# Patient Record
Sex: FEMALE | Race: WHITE | Employment: FULL TIME | URBAN - METROPOLITAN AREA
[De-identification: names, ages, dates, MRNs, and addresses within clinical notes are randomized per-mention and may not be internally consistent; named-entity substitution may affect disease eponyms.]

---

## 2018-04-20 ENCOUNTER — THERAPY VISIT (OUTPATIENT)
Dept: PHYSICAL THERAPY | Facility: CLINIC | Age: 48
End: 2018-04-20
Payer: OTHER MISCELLANEOUS

## 2018-04-20 DIAGNOSIS — M25.562 LEFT KNEE PAIN: Primary | ICD-10-CM

## 2018-04-20 DIAGNOSIS — R26.9 GAIT ABNORMALITY: ICD-10-CM

## 2018-04-20 PROCEDURE — 97162 PT EVAL MOD COMPLEX 30 MIN: CPT | Mod: GP | Performed by: PHYSICAL THERAPIST

## 2018-04-20 PROCEDURE — 97110 THERAPEUTIC EXERCISES: CPT | Mod: GP | Performed by: PHYSICAL THERAPIST

## 2018-04-20 ASSESSMENT — ACTIVITIES OF DAILY LIVING (ADL)
PAIN: THE SYMPTOM PREVENTS ME FROM ALL DAILY ACTIVITIES
KNEEL ON THE FRONT OF YOUR KNEE: I AM UNABLE TO DO THE ACTIVITY
STAND: ACTIVITY IS VERY DIFFICULT
SIT WITH YOUR KNEE BENT: ACTIVITY IS VERY DIFFICULT
AS_A_RESULT_OF_YOUR_KNEE_INJURY,_HOW_WOULD_YOU_RATE_YOUR_CURRENT_LEVEL_OF_DAILY_ACTIVITY?: SEVERELY ABNORMAL
WALK: ACTIVITY IS VERY DIFFICULT
GIVING WAY, BUCKLING OR SHIFTING OF KNEE: THE SYMPTOM PREVENTS ME FROM ALL DAILY ACTIVITIES
GO DOWN STAIRS: ACTIVITY IS VERY DIFFICULT
SQUAT: I AM UNABLE TO DO THE ACTIVITY
WEAKNESS: THE SYMPTOM PREVENTS ME FROM ALL DAILY ACTIVITIES
STIFFNESS: THE SYMPTOM PREVENTS ME FROM ALL DAILY ACTIVITIES
HOW_WOULD_YOU_RATE_THE_OVERALL_FUNCTION_OF_YOUR_KNEE_DURING_YOUR_USUAL_DAILY_ACTIVITIES?: SEVERELY ABNORMAL
HOW_WOULD_YOU_RATE_THE_CURRENT_FUNCTION_OF_YOUR_KNEE_DURING_YOUR_USUAL_DAILY_ACTIVITIES_ON_A_SCALE_FROM_0_TO_100_WITH_100_BEING_YOUR_LEVEL_OF_KNEE_FUNCTION_PRIOR_TO_YOUR_INJURY_AND_0_BEING_THE_INABILITY_TO_PERFORM_ANY_OF_YOUR_USUAL_DAILY_ACTIVITIES?: 10
GO UP STAIRS: ACTIVITY IS VERY DIFFICULT
RISE FROM A CHAIR: ACTIVITY IS VERY DIFFICULT
SWELLING: THE SYMPTOM PREVENTS ME FROM ALL DAILY ACTIVITIES

## 2018-04-20 NOTE — MR AVS SNAPSHOT
After Visit Summary   4/20/2018    Charlotte Driscoll    MRN: [id number]           Patient Information     Date Of Birth          1970        Visit Information        Provider Department      4/20/2018 8:50 AM Clementina Pardo PT [place] [facility] Physical Therapy        Today's Diagnoses     Left knee pain    -  1    Gait abnormality           Follow-ups after your visit        Your next 10 appointments already scheduled     Apr 23, 2018  1:50 PM CDT   CHARANJIT Extremity with Eri Baron ATC   [place] [facility] ([facility]  [facility]    [address] [place] [address]   [phone]            Apr 25, 2018  2:00 PM CDT   CHARANJIT Extremity with Eri Baron ATC   [facility] Physical Therapy ([facility]  )    [address] [place] [address]   [phone]            Apr 30, 2018  1:50 PM CDT   CHARANJIT Extremity with Eri Baron ATC   [place] [facility] ([facility]  [facility]    [address] [place] [address]   [phone]            May 02, 2018  2:00 PM CDT   CHARANJIT Extremity with Eri Baron ATC   [facility] ([facility]  )    [address] [place] [address]   [phone]            May 07, 2018 12:50 PM CDT   CHARANJIT Extremity with Clementina Pardo PT   [place] [facility] ([facility]  )    [address] [place] [address]   [phone]            May 09, 2018  2:00 PM CDT   CHARANJIT Extremity with Eri Baron ATC   [facility] ([facility]  )    [address] [place] [address]   [phone]            May 14, 2018 11:50 AM CDT   CHARANJIT Extremity with Eri Baron ATC   [facility]  "Physical Therapy ([facility]  )    [address] [address]   [phone]            May 16, 2018  2:00 PM RADHAT   CHARANJIT [place] with Eri Baron ATC   [facility] ([facility]  )    [address] [address]   [phone]              Who to contact     If you have questions or need follow up information about today's clinic visit or your schedule please contact [facility] directly at [phone].  Normal or non-critical lab and imaging results will be communicated to you by MyChart, letter or phone within 4 business days after the clinic has received the results. If you do not hear from us within 7 days, please contact the clinic through [employer] or phone. If you have a critical or abnormal lab result, we will notify you by phone as soon as possible.  Submit refill requests through [employer] or call your pharmacy and they will forward the refill request to us. Please allow 3 business days for your refill to be completed.          Additional Information About Your Visit        [employer] Information     [employer] lets you send messages to your doctor, view your test results, renew your prescriptions, schedule appointments and more. To sign up, go to [email or web address] . Click on \"Log in\" on the left side of the screen, which will take you to the Welcome page. Then click on \"Sign up Now\" on the right side of the page.     You will be asked to enter the access code listed below, as well as some personal information. Please follow the directions to create your username and password.     Your access code is: [email or web address]  Expires: 2018 10:17 AM     Your access code will  in 90 days. If you need help or a new code, please call your [place] clinic or [phone].        Care EveryWhere ID     This is your Care EveryWhere ID. This could be used by other " organizations to access your [place] medical records  [id number]         Blood Pressure from Last 3 Encounters:   12/01/16 132/89   05/13/14 120/72    Weight from Last 3 Encounters:   12/01/16 83.9 kg (185 lb)              We Performed the Following     CHARANJIT Inital Eval Report     PT Eval, Moderate Complexity (06623)     Therapeutic Exercises        Primary Care Provider Office Phone # Fax #    Hope Griggs [facility] [phone]       [address] [place] [address]        Equal Access to Services     [facility] Hospital[facility]UNDE : Hadii aad ku hadasho Soomaali, waaxda luqadaha, qaybta kaalmada adeegyada, waxay idiin hayaan endy pickett . So [facility] [phone].    ATENCIÓN: Si habla español, tiene a aguilar disposición servicios gratuitos de asistencia lingüística. [facility] [phone].    We comply with applicable federal civil rights laws and [place] laws. We do not discriminate on the basis of race, color, national origin, age, disability, sex, sexual orientation, or gender identity.            Thank you!     Thank you for choosing [facility]  for your care. Our goal is always to provide you with excellent care. Hearing back from our patients is one way we can continue to improve our services. Please take a few minutes to complete the written survey that you may receive in the mail after your visit with us. Thank you!             Your Updated Medication List - Protect others around you: Learn how to safely use, store and throw away your medicines at www.disposemymeds.org.          This list is accurate as of 4/20/18 10:17 AM.  Always use your most recent med list.                   Brand Name Dispense Instructions for use Diagnosis    ibuprofen 800 MG tablet    ADVIL/MOTRIN    100 tablet    Take 1 tablet (800 mg) by mouth every 8 hours as needed for moderate pain    Dog bite of right lower leg, initial encounter       LYRICA PO      Take 100 mg by  mouth 3 times daily        oxyCODONE-acetaminophen 5-325 MG per tablet    PERCOCET     Take 1 tablet by mouth 2 times daily

## 2018-04-20 NOTE — LETTER
[facility]  [address] [address]  [phone]    2018    Re: Charlotte Driscoll   :   1970  MRN:  [id number]   REFERRING PHYSICIAN:   Darius Hooks    [facility] PHYSICAL [facility]    Date of Initial Evaluation:  2018  Visits:  Rxs Used: 1  Reason for Referral:     Left knee pain  Gait abnormality    EVALUATION SUMMARY    [facility]  Subjective:  Patient is a 47 year old female presenting with rehab left knee hpi. The history is provided by the patient.   Charlotte Driscoll is a 47 year old female with a left knee condition.  Condition occurred with:  A fall/slip.  Condition occurred: at work.  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 Smoker    PMH: L ankle instability, planning to have surgery for that as soon as her knee recovers.    Patient reports pain:  Anterior.  Radiates to:  Thigh and knee.  Pain is described as sharp  and reported as 4/10.  Associated symptoms:  Loss of motion/stiffness and edema. Pain is worse in the P.M..  Symptoms are exacerbated by weight bearing, activity, walking, ascending stairs and descending stairs and relieved by ice.  Since onset symptoms are gradually improving.    Previous treatment includes surgery.    General health as reported by patient is fair.                          Objective:    Gait:  Using a crutch prn  Gait Type:  Antalgic   Weight Bearing Status:  WBAT     Deviations:  Knee:  Knee extension decr LAnkle:  Push off decr L       Knee Evaluation:  ROM:  Strength wnl knee: L knee fair QS, SLR with assist.  AROM      Re: Charlotte Driscoll   :   1970    Extension: Left: 0    Right:   Flexion: Left: 105   Right:    Palpation:  Palpation of knee: blistering rash on distal quad across  top of distal thigh. Pt reports MD diagnosed as allergic reaction. Inciisions dry and healing.  Left knee tenderness present at:  Medial Joint Line and Incisional    Edema:  Edema of the knee: + brush test, general effusion throughout L knee.    Assessment/Plan:    Patient is a 47 year old female with left side knee complaints.    Patient has the following significant findings with corresponding treatment plan.                Diagnosis 1:  S/p L knee partial menisectomy  Pain -  hot/cold therapy, manual therapy, self management, education and home program  Decreased ROM/flexibility - manual therapy, therapeutic exercise and home program  Decreased joint mobility - manual therapy, therapeutic exercise and home program  Decreased strength - therapeutic exercise, therapeutic activities and home program  Decreased proprioception - neuro re-education, gait training, therapeutic activities and home program  Edema - electric stimulation, cold therapy and self management/home program  Impaired gait - gait training and home program    Therapy Evaluation Codes:   1) History comprised of:   Personal factors that impact the plan of care:      Age, Gender, Living environment, Past/current experiences, Profession, Time   since onset of symptoms and Work status.    Comorbidity factors that impact the plan of care are:      Smoking and Weakness.     Medications impacting care: Anti-inflammatory and Pain.  2) Examination of Body Systems comprised of:   Body structures and functions that impact the plan of care:      Ankle, Knee and Pelvis.   Activity limitations that impact the plan of care are:      Squatting/kneeling, Stairs, Standing and Walking.  3) Clinical presentation characteristics are:   Evolving/Changing.  4) Decision-Making    Moderate complexity using standardized patient assessment instrument and/or   measureable assessment of functional outcome.  Cumulative Therapy Evaluation is: Moderate complexity.    Previous and  current functional limitations:  (See Goal Flow Sheet for this information)    Short term and Long term goals: (See Goal Flow Sheet for this information)     Communication ability:  Patient appears to be able to clearly communicate and understand verbal and written communication and follow directions correctly.  Treatment Explanation - The following has been discussed with the patient:   RX ordered/plan of care    Re: Charlotte Driscoll   :   1970    Anticipated outcomes  Possible risks and side effects  This patient would benefit from PT intervention to resume normal activities.   Rehab potential is good.    Frequency:  2 X week, once daily  Duration:  for 4 weeks tapering to 1 X a week over 4 weeks  Discharge Plan:  Achieve all LTG.  Independent in home treatment program.  Return to work with or without restrictions.  Reach maximal therapeutic benefit.    Please refer to the daily flowsheet for treatment today, total treatment time and time spent performing 1:1 timed codes.     Thank you for your referral.    INQUIRIES  Therapist: Clementina Pardo DPT  [facility] PHYSICAL THERAP[facility]  [address] [place] [address]  Phone: [phone]  Fax: [phone]

## 2018-04-20 NOTE — PROGRESS NOTES
[place] [facility] Initial Evaluation  Subjective:  Patient is a 47 year old female presenting with rehab left knee hpi. The history is provided by the patient.   Charlotte Driscoll is a 47 year old female with a left knee condition.  Condition occurred with:  A fall/slip.  Condition occurred: at work.  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    Patient reports pain:  Anterior.  Radiates to:  Thigh and knee.  Pain is described as sharp  and reported as 4/10.  Associated symptoms:  Loss of motion/stiffness and edema. Pain is worse in the P.M..  Symptoms are exacerbated by weight bearing, activity, walking, ascending stairs and descending stairs and relieved by ice.  Since onset symptoms are gradually improving.    Previous treatment includes surgery.    General health as reported by patient is fair.                                              Objective:    Gait:  Using a crutch prn  Gait Type:  Antalgic   Weight Bearing Status:  WBAT     Deviations:  Knee:  Knee extension decr LAnkle:  Push off decr L                                                      Knee Evaluation:  ROM:  Strength wnl knee: L knee fair QS, SLR with assist.  AROM      Extension: Left: 0    Right:   Flexion: Left: 105   Right:              Palpation:  Palpation of knee: blistering rash on distal quad across top of distal thigh. Pt reports MD diagnosed as allergic reaction. Inciisions dry and healing.  Left knee tenderness present at:  Medial Joint Line and Incisional    Edema:  Edema of the knee: + brush test, general effusion throughout L knee.            General     ROS    Assessment/Plan:    Patient is a 47 year old female with left side knee complaints.    Patient has the following significant findings with corresponding  treatment plan.                Diagnosis 1:  S/p L knee partial menisectomy  Pain -  hot/cold therapy, manual therapy, self management, education and home program  Decreased ROM/flexibility - manual therapy, therapeutic exercise and home program  Decreased joint mobility - manual therapy, therapeutic exercise and home program  Decreased strength - therapeutic exercise, therapeutic activities and home program  Decreased proprioception - neuro re-education, gait training, therapeutic activities and home program  Edema - electric stimulation, cold therapy and self management/home program  Impaired gait - gait training and home program    Therapy Evaluation Codes:   1) History comprised of:   Personal factors that impact the plan of care:      Age, Gender, Living environment, Past/current experiences, Profession, Time since onset of symptoms and Work status.    Comorbidity factors that impact the plan of care are:      Smoking and Weakness.     Medications impacting care: Anti-inflammatory and Pain.  2) Examination of Body Systems comprised of:   Body structures and functions that impact the plan of care:      Ankle, Knee and Pelvis.   Activity limitations that impact the plan of care are:      Squatting/kneeling, Stairs, Standing and Walking.  3) Clinical presentation characteristics are:   Evolving/Changing.  4) Decision-Making    Moderate complexity using standardized patient assessment instrument and/or measureable assessment of functional outcome.  Cumulative Therapy Evaluation is: Moderate complexity.    Previous and current functional limitations:  (See Goal Flow Sheet for this information)    Short term and Long term goals: (See Goal Flow Sheet for this information)     Communication ability:  Patient appears to be able to clearly communicate and understand verbal and written communication and follow directions correctly.  Treatment Explanation - The following has been discussed with the patient:   RX  ordered/plan of care  Anticipated outcomes  Possible risks and side effects  This patient would benefit from PT intervention to resume normal activities.   Rehab potential is good.    Frequency:  2 X week, once daily  Duration:  for 4 weeks tapering to 1 X a week over 4 weeks  Discharge Plan:  Achieve all LTG.  Independent in home treatment program.  Return to work with or without restrictions.  Reach maximal therapeutic benefit.    Please refer to the daily flowsheet for treatment today, total treatment time and time spent performing 1:1 timed codes.

## 2018-04-23 ENCOUNTER — THERAPY VISIT (OUTPATIENT)
Dept: PHYSICAL THERAPY | Facility: CLINIC | Age: 48
End: 2018-04-23
Payer: OTHER MISCELLANEOUS

## 2018-04-23 DIAGNOSIS — R26.9 GAIT ABNORMALITY: ICD-10-CM

## 2018-04-23 DIAGNOSIS — M25.562 LEFT KNEE PAIN: ICD-10-CM

## 2018-04-23 PROCEDURE — 97112 NEUROMUSCULAR REEDUCATION: CPT | Mod: GP

## 2018-04-23 PROCEDURE — 97110 THERAPEUTIC EXERCISES: CPT | Mod: GP

## 2018-04-25 ENCOUNTER — THERAPY VISIT (OUTPATIENT)
Dept: PHYSICAL THERAPY | Facility: CLINIC | Age: 48
End: 2018-04-25
Payer: OTHER MISCELLANEOUS

## 2018-04-25 DIAGNOSIS — R26.9 GAIT ABNORMALITY: ICD-10-CM

## 2018-04-25 DIAGNOSIS — M25.562 LEFT KNEE PAIN: ICD-10-CM

## 2018-04-25 PROCEDURE — 97110 THERAPEUTIC EXERCISES: CPT | Mod: GP

## 2018-04-25 PROCEDURE — 97112 NEUROMUSCULAR REEDUCATION: CPT | Mod: GP

## 2018-05-07 ENCOUNTER — THERAPY VISIT (OUTPATIENT)
Dept: PHYSICAL THERAPY | Facility: CLINIC | Age: 48
End: 2018-05-07
Payer: OTHER MISCELLANEOUS

## 2018-05-07 DIAGNOSIS — M25.562 LEFT KNEE PAIN: ICD-10-CM

## 2018-05-07 DIAGNOSIS — R26.9 GAIT ABNORMALITY: ICD-10-CM

## 2018-05-07 PROCEDURE — 97112 NEUROMUSCULAR REEDUCATION: CPT | Mod: GP | Performed by: PHYSICAL THERAPIST

## 2018-05-07 PROCEDURE — 97110 THERAPEUTIC EXERCISES: CPT | Mod: GP | Performed by: PHYSICAL THERAPIST

## 2018-05-09 ENCOUNTER — THERAPY VISIT (OUTPATIENT)
Dept: PHYSICAL THERAPY | Facility: CLINIC | Age: 48
End: 2018-05-09
Payer: OTHER MISCELLANEOUS

## 2018-05-09 DIAGNOSIS — R26.9 GAIT ABNORMALITY: ICD-10-CM

## 2018-05-09 DIAGNOSIS — M25.562 LEFT KNEE PAIN: ICD-10-CM

## 2018-05-09 PROCEDURE — 97110 THERAPEUTIC EXERCISES: CPT | Mod: GP

## 2018-05-09 PROCEDURE — 97112 NEUROMUSCULAR REEDUCATION: CPT | Mod: GP

## 2018-05-16 ENCOUNTER — THERAPY VISIT (OUTPATIENT)
Dept: PHYSICAL THERAPY | Facility: CLINIC | Age: 48
End: 2018-05-16
Payer: OTHER MISCELLANEOUS

## 2018-05-16 DIAGNOSIS — R26.9 GAIT ABNORMALITY: ICD-10-CM

## 2018-05-16 DIAGNOSIS — M25.562 LEFT KNEE PAIN: ICD-10-CM

## 2018-05-16 PROCEDURE — 97112 NEUROMUSCULAR REEDUCATION: CPT | Mod: GP

## 2018-05-16 PROCEDURE — 97110 THERAPEUTIC EXERCISES: CPT | Mod: GP

## 2018-05-16 NOTE — MR AVS SNAPSHOT
After Visit Summary   5/16/2018    Charlotte Driscoll    MRN: [id number]           Patient Information     Date Of Birth          1970        Visit Information        Provider Department      5/16/2018 2:00 PM Eri Baron ATC [facility]        Today's Diagnoses     Left knee pain        Gait abnormality           Follow-ups after your visit        Your next 10 appointments already scheduled     May 23, 2018 12:40 PM CDT   CHARANJIT Extremity with Eri Baron ATC   [facility] Physical Therapy ([facility]  )    [address] [place] [address]   [phone]            May 30, 2018 12:40 PM CDT   CHARANJIT Extremity with Eri Baron ATC   [facility] Physical Therapy ([facility]  )    [address] [place] [address]   [phone]            Jun 06, 2018 10:10 AM CDT   CHARANJIT Extremity with Clementina Pardo PT   [facility] Physical Therapy ([facility]  )    [address] [place] [address]   [phone]              Who to contact     If you have questions or need follow up information about today's clinic visit or your schedule please contact [facility] PHYSICAL THERAPY directly at [phone].  Normal or non-critical lab and imaging results will be communicated to you by MyChart, letter or phone within 4 business days after the clinic has received the results. If you do not hear from us within 7 days, please contact the clinic through MyChart or phone. If you have a critical or abnormal lab result, we will notify you by phone as soon as possible.  Submit refill requests through [employer] or call your pharmacy and they will forward the refill request to us. Please allow 3 business days for your refill to be completed.          Additional Information About Your  "Visit        [employer] Information     [employer] lets you send messages to your doctor, view your test results, renew your prescriptions, schedule appointments and more. To sign up, go to [email or web address] . Click on \"Log in\" on the left side of the screen, which will take you to the Welcome page. Then click on \"Sign up Now\" on the right side of the page.     You will be asked to enter the access code listed below, as well as some personal information. Please follow the directions to create your username and password.     Your access code is: [email or web address]  Expires: 2018 10:17 AM     Your access code will  in 90 days. If you need help or a new code, please call your [place] clinic or [phone].        Care EveryWhere ID     This is your Care EveryWhere ID. This could be used by other organizations to access your [place] medical records  [id number]         Blood Pressure from Last 3 Encounters:   16 132/89   14 120/72    Weight from Last 3 Encounters:   16 83.9 kg (185 lb)              We Performed the Following     Neuromuscular Re-Education     Therapeutic Exercises        Primary Care Provider Office Phone # Fax #    [facility] [phone]       [address] [place], [place] [id number]        Equal Access to Services     CLARA COOPER : Hadii mynor garcia hadkhoio Socaryn, waaxda luqadaha, qaybta kaalmada ketty, rula pickett . So [facility] [phone].    ATENCIÓN: Si habla español, tiene a aguilar disposición servicios gratuitos de asistencia lingüística. Shant al [phone].    We comply with applicable federal civil rights laws and [place] laws. We do not discriminate on the basis of race, color, national origin, age, disability, sex, sexual orientation, or gender identity.            Thank you!     Thank you for choosing [facility] PHYSICAL THERAPY  for your care. Our goal is always to provide " you with excellent care. Hearing back from our patients is one way we can continue to improve our services. Please take a few minutes to complete the written survey that you may receive in the mail after your visit with us. Thank you!             Your Updated Medication List - Protect others around you: Learn how to safely use, store and throw away your medicines at www.disposemymeds.org.          This list is accurate as of 5/16/18  2:45 PM.  Always use your most recent med list.                   Brand Name Dispense Instructions for use Diagnosis    ibuprofen 800 MG tablet    ADVIL/MOTRIN    100 tablet    Take 1 tablet (800 mg) by mouth every 8 hours as needed for moderate pain    Dog bite of right lower leg, initial encounter       LYRICA PO      Take 100 mg by mouth 3 times daily        oxyCODONE-acetaminophen 5-325 MG per tablet    PERCOCET     Take 1 tablet by mouth 2 times daily

## 2018-05-16 NOTE — PROGRESS NOTES
Subjective:  HPI                    Objective:  System    Physical Exam    General     ROS    Assessment/Plan:    PROGRESS  REPORT    Progress reporting period is from 4/20/2018 to 5/16/2018.     SUBJECTIVE  Subjective: feeling good today. pushing herself a little more.  Can walk a mile, mild soreness post walk but no pain. Pt reports feeling less stiff overall.                 ;   ,     The subjective and objective information are from the last SOAP note on this patient.    OBJECTIVE  Objective: see MD Friday. still struggles with eccentric strength .balance improving. Good quad setting with SLR- no lag present active knee flex in supine- knee to chest positon (135      ASSESSMENT/PLAN  Updated problem list and treatment plan: Diagnosis 1:  S/p Left medial  Knee menisectomy  Pain -  hot/cold therapy, self management and home program  Decreased ROM/flexibility - therapeutic exercise, therapeutic activity and home program  Decreased strength - therapeutic exercise, therapeutic activities and home program  Decreased proprioception - neuro re-education, therapeutic activities and home program  STG/LTGs have been met or progress has been made towards goals:  Yes (See Goal flow sheet completed today.)  Assessment of Progress: The patient's condition is improving.  Patient is meeting short term goals and is progressing towards long term goals.  Self Management Plans:  Patient has been instructed in a home treatment program.  Patient is independent in a home treatment program.  I have re-evaluated this patient and find that the nature, scope, duration and intensity of the therapy is appropriate for the medical condition of the patient.  Charlotte continues to require the following intervention to meet STG and LTG's: PT  The patient is returning to your office for a recheck appointment.    Recommendations:  This patient would benefit from continued therapy.     Frequency:  1 X week, once daily  Duration:  For 3 additional  visit.  That would be a total of 9 visits authorized by insurance      This patient would benefit from further evaluation.    Please refer to the daily flowsheet for treatment today, total treatment time and time spent performing 1:1 timed codes.

## 2018-05-23 ENCOUNTER — THERAPY VISIT (OUTPATIENT)
Dept: PHYSICAL THERAPY | Facility: CLINIC | Age: 48
End: 2018-05-23
Payer: OTHER MISCELLANEOUS

## 2018-05-23 DIAGNOSIS — R26.9 GAIT ABNORMALITY: ICD-10-CM

## 2018-05-23 DIAGNOSIS — M25.562 LEFT KNEE PAIN: ICD-10-CM

## 2018-05-23 PROCEDURE — 97110 THERAPEUTIC EXERCISES: CPT | Mod: GP | Performed by: PHYSICAL THERAPIST

## 2018-05-23 PROCEDURE — 97530 THERAPEUTIC ACTIVITIES: CPT | Mod: GP | Performed by: PHYSICAL THERAPIST

## 2018-05-23 PROCEDURE — 97112 NEUROMUSCULAR REEDUCATION: CPT | Mod: GP | Performed by: PHYSICAL THERAPIST

## 2018-06-06 ENCOUNTER — THERAPY VISIT (OUTPATIENT)
Dept: PHYSICAL THERAPY | Facility: CLINIC | Age: 48
End: 2018-06-06
Payer: OTHER MISCELLANEOUS

## 2018-06-06 DIAGNOSIS — M25.562 LEFT KNEE PAIN: ICD-10-CM

## 2018-06-06 DIAGNOSIS — R26.9 GAIT ABNORMALITY: ICD-10-CM

## 2018-06-06 PROCEDURE — 97110 THERAPEUTIC EXERCISES: CPT | Mod: GP | Performed by: PHYSICAL THERAPIST

## 2018-06-06 PROCEDURE — 97112 NEUROMUSCULAR REEDUCATION: CPT | Mod: GP | Performed by: PHYSICAL THERAPIST

## 2018-06-06 NOTE — LETTER
[facility]  [address] [address]  [phone]    2018    Re: Charlotte Driscoll   :   1970  MRN:  [id number]   REFERRING PHYSICIAN:   Darius Hooks    [facility]    Date of Initial Evaluation:  18  Visits:  Rxs Used: 5  Reason for Referral:     Left knee pain  Gait abnormality    PROGRESS  REPORT    Progress reporting period is from 18 to 18.     SUBJECTIVE  Subjective: Back full time light duty last week. Tiring. Has not done PT exercises    Changes in function: Yes, see goal flow sheet for change in function   Adverse reactions: Activity:;   , Adverse reaction activity: return to work, inc fatigue   The objective findings are from DOS 18.    OBJECTIVE  Objective: AROM Comparable B, B knee edema present, comparable B, good QS B. Non-antalgic gait. Instr patient that she needs to remain consistent with HEP with return to work.      ASSESSMENT/PLAN  Updated problem list and treatment plan: Diagnosis 1:  S/p L knee med menisectomy  Pain -  manual therapy, splint/taping/bracing/orthotics, self management, education and home program  Decreased ROM/flexibility - manual therapy, therapeutic exercise and home program  Decreased strength - therapeutic exercise, therapeutic activities and home program  Impaired balance - neuro re-education, gait training, therapeutic activities and home program  Decreased proprioception - neuro re-education, gait training, therapeutic activities and home program  STG/LTGs have been met or progress has been made towards goals:  Yes (See Goal flow sheet completed today.)    Re: Charlotte Driscoll   :   1970    Assessment of Progress: The patient's condition is improving.  The patient's condition has potential to improve.  Patient is meeting short term goals and is progressing towards long term goals.  Self Management  Plans:  Patient has been instructed in a home treatment program.  Patient  has been instructed in self management of symptoms.  I have re-evaluated this patient and find that the nature, scope, duration and intensity of the therapy is appropriate for the medical condition of the patient.  Charlotte continues to require the following intervention to meet STG and LTG's: PT    Recommendations:  This patient would benefit from continued therapy.     Frequency:  1 X week, once daily  Duration:  for 4 weeks tapering to 2 X a month over 6 weeks    Please refer to the daily flowsheet for treatment today, total treatment time and time spent performing 1:1 timed codes.        Thank you for your referral.    INQUIRIES  Therapist: Clementina Pardo DPT  [facility]  [address] [place] [address]  Phone: [phone]  Fax: [phone]

## 2018-06-06 NOTE — MR AVS SNAPSHOT
After Visit Summary   6/6/2018    Charlotte Driscoll    MRN: [id number]           Patient Information     Date Of Birth          1970        Visit Information        Provider Department      6/6/2018 10:10 AM Clementina Pardo PT [facility]        Today's Diagnoses     Left knee pain        Gait abnormality           Follow-ups after your visit        Your next 10 appointments already scheduled     Jun 22, 2018  3:20 PM CDT   CHARANJIT Extremity with Clementina Pardo PT   [facility] ([facility]  )    [address] [place] [address]   [phone]            Jun 27, 2018 12:50 PM CDT   CHARANJIT Extremity with Clementina Pardo PT   [facility] ([facility]  )    [address] [place] [address]   [phone]            Jul 06, 2018  4:40 PM CDT   CHARANJIT Extremity with Clementina Pardo PT   [facility] ([facility]  )    [address] [place] [address]   [phone]              Who to contact     If you have questions or need follow up information about today's clinic visit or your schedule please contact [facility] PHYSICAL THERAPY directly at [phone].  Normal or non-critical lab and imaging results will be communicated to you by MyChart, letter or phone within 4 business days after the clinic has received the results. If you do not hear from us within 7 days, please contact the clinic through MyChart or phone. If you have a critical or abnormal lab result, we will notify you by phone as soon as possible.  Submit refill requests through [email or web address] or call your pharmacy and they will forward the refill request to us. Please allow 3 business days for your refill to be completed.          Additional Information About Your  "Visit        G[employer] Information     PubNub lets you send messages to your doctor, view your test results, renew your prescriptions, schedule appointments and more. To sign up, go to [email or web address] . Click on \"Log in\" on the left side of the screen, which will take you to the Welcome page. Then click on \"Sign up Now\" on the right side of the page.     You will be asked to enter the access code listed below, as well as some personal information. Please follow the directions to create your username and password.     Your access code is: [email or web address]  Expires: 2018 10:17 AM     Your access code will  in 90 days. If you need help or a new code, please call your [place] clinic or [phone].        Care EveryWhere ID     This is your Care EveryWhere ID. This could be used by other organizations to access your [place] medical records  [id number]         Blood Pressure from Last 3 Encounters:   16 132/89   14 120/72    Weight from Last 3 Encounters:   16 83.9 kg (185 lb)              We Performed the Following     CHARANJIT Progress Notes Report     Neuromuscular Re-Education     Therapeutic Exercises        Primary Care Provider Office Phone # Fax #    [facility] [phone]       [address] [place], [place] [id number]        Equal Access to Services     GEOVANI COOPER : Hadii mynor garcia hadkhoio Socaryn, waaxda luqadaha, qaybta kaalmada ketty, rula pickett . So [facility] [phone].    ATENCIÓN: Si habla español, tiene a aguilar disposición servicios gratuitos de asistencia lingüística. Shant al [phone].    We comply with applicable federal civil rights laws and [place] laws. We do not discriminate on the basis of race, color, national origin, age, disability, sex, sexual orientation, or gender identity.            Thank you!     Thank you for choosing [facility]  for your care. " Our goal is always to provide you with excellent care. Hearing back from our patients is one way we can continue to improve our services. Please take a few minutes to complete the written survey that you may receive in the mail after your visit with us. Thank you!             Your Updated Medication List - Protect others around you: Learn how to safely use, store and throw away your medicines at www.disposemymeds.org.          This list is accurate as of 6/6/18 11:08 AM.  Always use your most recent med list.                   Brand Name Dispense Instructions for use Diagnosis    ibuprofen 800 MG tablet    ADVIL/MOTRIN    100 tablet    Take 1 tablet (800 mg) by mouth every 8 hours as needed for moderate pain    Dog bite of right lower leg, initial encounter       LYRICA PO      Take 100 mg by mouth 3 times daily        oxyCODONE-acetaminophen 5-325 MG per tablet    PERCOCET     Take 1 tablet by mouth 2 times daily

## 2018-06-06 NOTE — PROGRESS NOTES
Subjective:  HPI                    Objective:  System    Physical Exam    General     ROS    Assessment/Plan:    PROGRESS  REPORT    Progress reporting period is from 4/20/18 to 6/6/18.     SUBJECTIVE  Subjective: Back full time light duty last week. Tiring. Has not done PT exercises            Changes in function: Yes, see goal flow sheet for change in function   Adverse reactions: Activity:;   , Adverse reaction activity: return to work, inc fatigue   The objective findings are from DOS 6/6/18.    OBJECTIVE  Objective: AROM Comparable B, B knee edema present, comparable B, good QS B. Non-antalgic gait. Instr patient that she needs to remain consistent with HEP with return to work.      ASSESSMENT/PLAN  Updated problem list and treatment plan: Diagnosis 1:  S/p L knee med menisectomy  Pain -  manual therapy, splint/taping/bracing/orthotics, self management, education and home program  Decreased ROM/flexibility - manual therapy, therapeutic exercise and home program  Decreased strength - therapeutic exercise, therapeutic activities and home program  Impaired balance - neuro re-education, gait training, therapeutic activities and home program  Decreased proprioception - neuro re-education, gait training, therapeutic activities and home program  STG/LTGs have been met or progress has been made towards goals:  Yes (See Goal flow sheet completed today.)  Assessment of Progress: The patient's condition is improving.  The patient's condition has potential to improve.  Patient is meeting short term goals and is progressing towards long term goals.  Self Management Plans:  Patient has been instructed in a home treatment program.  Patient  has been instructed in self management of symptoms.  I have re-evaluated this patient and find that the nature, scope, duration and intensity of the therapy is appropriate for the medical condition of the patient.  Charlotte continues to require the following intervention to meet STG and  LTG's: PT      Recommendations:  This patient would benefit from continued therapy.     Frequency:  1 X week, once daily  Duration:  for 4 weeks tapering to 2 X a month over 6 weeks        Please refer to the daily flowsheet for treatment today, total treatment time and time spent performing 1:1 timed codes.

## 2018-06-13 ENCOUNTER — THERAPY VISIT (OUTPATIENT)
Dept: PHYSICAL THERAPY | Facility: CLINIC | Age: 48
End: 2018-06-13
Payer: OTHER MISCELLANEOUS

## 2018-06-13 DIAGNOSIS — M25.562 LEFT KNEE PAIN: ICD-10-CM

## 2018-06-13 DIAGNOSIS — R26.9 GAIT ABNORMALITY: ICD-10-CM

## 2018-06-13 PROCEDURE — 97530 THERAPEUTIC ACTIVITIES: CPT | Mod: GP | Performed by: PHYSICAL THERAPIST

## 2018-06-13 PROCEDURE — 97112 NEUROMUSCULAR REEDUCATION: CPT | Mod: GP | Performed by: PHYSICAL THERAPIST

## 2018-06-13 PROCEDURE — 97110 THERAPEUTIC EXERCISES: CPT | Mod: GP | Performed by: PHYSICAL THERAPIST

## 2018-06-22 ENCOUNTER — THERAPY VISIT (OUTPATIENT)
Dept: PHYSICAL THERAPY | Facility: CLINIC | Age: 48
End: 2018-06-22
Payer: OTHER MISCELLANEOUS

## 2018-06-22 DIAGNOSIS — M25.562 LEFT KNEE PAIN: ICD-10-CM

## 2018-06-22 DIAGNOSIS — R26.9 GAIT ABNORMALITY: ICD-10-CM

## 2018-06-22 PROCEDURE — 97110 THERAPEUTIC EXERCISES: CPT | Mod: GP | Performed by: PHYSICAL THERAPIST

## 2018-06-22 PROCEDURE — 97112 NEUROMUSCULAR REEDUCATION: CPT | Mod: GP | Performed by: PHYSICAL THERAPIST

## 2018-06-27 ENCOUNTER — THERAPY VISIT (OUTPATIENT)
Dept: PHYSICAL THERAPY | Facility: CLINIC | Age: 48
End: 2018-06-27
Payer: OTHER MISCELLANEOUS

## 2018-06-27 DIAGNOSIS — R26.9 GAIT ABNORMALITY: ICD-10-CM

## 2018-06-27 PROCEDURE — 97110 THERAPEUTIC EXERCISES: CPT | Mod: GP

## 2018-06-27 PROCEDURE — 97530 THERAPEUTIC ACTIVITIES: CPT | Mod: GP

## 2018-06-27 PROCEDURE — 97112 NEUROMUSCULAR REEDUCATION: CPT | Mod: GP

## 2018-07-06 ENCOUNTER — THERAPY VISIT (OUTPATIENT)
Dept: PHYSICAL THERAPY | Facility: CLINIC | Age: 48
End: 2018-07-06
Payer: OTHER MISCELLANEOUS

## 2018-07-06 DIAGNOSIS — M25.562 LEFT KNEE PAIN: ICD-10-CM

## 2018-07-06 DIAGNOSIS — R26.9 GAIT ABNORMALITY: ICD-10-CM

## 2018-07-06 PROCEDURE — 97112 NEUROMUSCULAR REEDUCATION: CPT | Mod: GP | Performed by: PHYSICAL THERAPIST

## 2018-07-06 PROCEDURE — 97110 THERAPEUTIC EXERCISES: CPT | Mod: GP | Performed by: PHYSICAL THERAPIST

## 2018-07-06 ASSESSMENT — ACTIVITIES OF DAILY LIVING (ADL)
PAIN: I HAVE THE SYMPTOM BUT IT DOES NOT AFFECT MY ACTIVITY
GIVING WAY, BUCKLING OR SHIFTING OF KNEE: THE SYMPTOM AFFECTS MY ACTIVITY MODERATELY
HOW_WOULD_YOU_RATE_THE_CURRENT_FUNCTION_OF_YOUR_KNEE_DURING_YOUR_USUAL_DAILY_ACTIVITIES_ON_A_SCALE_FROM_0_TO_100_WITH_100_BEING_YOUR_LEVEL_OF_KNEE_FUNCTION_PRIOR_TO_YOUR_INJURY_AND_0_BEING_THE_INABILITY_TO_PERFORM_ANY_OF_YOUR_USUAL_DAILY_ACTIVITIES?: 60
RAW_SCORE: 39
HOW_WOULD_YOU_RATE_THE_OVERALL_FUNCTION_OF_YOUR_KNEE_DURING_YOUR_USUAL_DAILY_ACTIVITIES?: NEARLY NORMAL
KNEE_ACTIVITY_OF_DAILY_LIVING_SUM: 39
STIFFNESS: THE SYMPTOM AFFECTS MY ACTIVITY SLIGHTLY
AS_A_RESULT_OF_YOUR_KNEE_INJURY,_HOW_WOULD_YOU_RATE_YOUR_CURRENT_LEVEL_OF_DAILY_ACTIVITY?: NEARLY NORMAL
WALK: ACTIVITY IS SOMEWHAT DIFFICULT
SIT WITH YOUR KNEE BENT: ACTIVITY IS NOT DIFFICULT
LIMPING: I HAVE THE SYMPTOM BUT IT DOES NOT AFFECT MY ACTIVITY
KNEEL ON THE FRONT OF YOUR KNEE: ACTIVITY IS VERY DIFFICULT
KNEE_ACTIVITY_OF_DAILY_LIVING_SCORE: 55.71
GO DOWN STAIRS: ACTIVITY IS VERY DIFFICULT
SWELLING: I HAVE THE SYMPTOM BUT IT DOES NOT AFFECT MY ACTIVITY
SQUAT: I AM UNABLE TO DO THE ACTIVITY
STAND: ACTIVITY IS MINIMALLY DIFFICULT
WEAKNESS: THE SYMPTOM AFFECTS MY ACTIVITY MODERATELY
RISE FROM A CHAIR: ACTIVITY IS SOMEWHAT DIFFICULT
GO UP STAIRS: ACTIVITY IS SOMEWHAT DIFFICULT

## 2018-07-06 NOTE — LETTER
[facility] ATHLETIC [facility] PHYSICAL [facility]  [address] [address]  [phone]    2018    Re: Charlotte Driscoll   :   1970  MRN:  [id number]   REFERRING PHYSICIAN:   Darius Hooks    [facility]    Date of Initial Evaluation:  18  Visits:  Rxs Used: 9  Reason for Referral:     Left knee pain  Gait abnormality      PROGRESS  REPORT    Progress reporting period is from 18 to 18.     SUBJECTIVE  Subjective: Back to [place] on Tuesday. Walking up to 2 miles. Concerned about stairs w/o a railing at most homes. Needs to be able to ascend/descend stairs w/o railing     Changes in function: Yes, see goal flow sheet for change in function   The objective findings are from DOS 18.    OBJECTIVE  Objective: gait w/o deviation, minimal swelling present, knee AROM comparable B. dec balance on uneven surfaces, dec endurance with stair descent      ASSESSMENT/PLAN  Updated problem list and treatment plan: Diagnosis 1:  S/p L knee menisectomy  Pain -  self management, education and home program  Decreased strength - therapeutic exercise, therapeutic activities and home program  Decreased proprioception - neuro re-education, gait training, therapeutic activities and home program  STG/LTGs have been met or progress has been made towards goals:  Yes (See Goal flow sheet completed today.)  Assessment of Progress: The patient's condition is improving.  The patient's condition has potential to improve.  Patient is meeting short term goals and is progressing towards long term goals.  Self Management Plans:  Patient has been instructed in a home treatment program.    Re: Charlotte Driscoll   :   1970    Patient is independent in a home treatment program.  Patient  has been instructed in self management of symptoms.  I have re-evaluated this patient and find that the nature, scope, duration and intensity of  the therapy is appropriate for the medical condition of the patient.  Charlotte continues to require the following intervention to meet STG and LTG's: PT  The patient is returning to your office for a recheck appointment.    Recommendations:  This patient would benefit from continued therapy.     Frequency:  2 X a month, once daily  Duration:  for 2 months    Please refer to the daily flowsheet for treatment today, total treatment time and time spent performing 1:1 timed codes.        Thank you for your referral.    INQUIRIES  Therapist: Clementina Pardo DPT  [facility]  [address] [place] [address]  Phone: [phone]  Fax: [phone]

## 2018-07-06 NOTE — MR AVS SNAPSHOT
After Visit Summary   7/6/2018    Charlotte Driscoll    MRN: [id number]           Patient Information     Date Of Birth          1970        Visit Information        Provider Department      7/6/2018 4:40 PM Clementina Pardo PT [facility] Physical Therapy        Today's Diagnoses     Left knee pain        Gait abnormality           Follow-ups after your visit        Your next 10 appointments already scheduled     Jul 12, 2018  3:50 PM CDT   CHARANJIT Extremity with Eri Baron ATC   [facility] ([facility]  )    [address] [address] [phone]              Who to contact     If you have questions or need follow up information about today's clinic visit or your schedule please contact [facility] ATHLETIC [facility] PHYSICAL THERAPY directly at [phone].  Normal or non-critical lab and imaging results will be communicated to you by MyChart, letter or phone within 4 business days after the clinic has received the results. If you do not hear from us within 7 days, please contact the clinic through MyChart or phone. If you have a critical or abnormal lab result, we will notify you by phone as soon as possible.  Submit refill requests through [employer] or call your pharmacy and they will forward the refill request to us. Please allow 3 business days for your refill to be completed.          Additional Information About Your Visit        Care EveryWhere ID     This is your Care EveryWhere ID. This could be used by other organizations to access your [place] medical records  [id number]         Blood Pressure from Last 3 Encounters:   12/01/16 132/89   05/13/14 120/72    Weight from Last 3 Encounters:   12/01/16 83.9 kg (185 lb)              We Performed the Following     CHARANJIT Progress Notes Report     Neuromuscular Re-Education     Therapeutic Exercises        Primary  Care Provider Office Phone # Fax #    [facility] [phone]       [address] [place] [address]        Equal Access to Services     GEOVANI COOPER : Hadeitan mynor garcia lacey Nuno, wamarlenda luqadaha, qaybta kaalmada ketty, rula shelley laDemetriusyovani hughes. So [facility] [phone].    ATENCIÓN: Si habla español, tiene a aguilar disposición servicios gratuitos de asistencia lingüística. Llame al [phone].    We comply with applicable federal civil rights laws and [place] laws. We do not discriminate on the basis of race, color, national origin, age, disability, sex, sexual orientation, or gender identity.            Thank you!     Thank you for choosing [facility] [facility] PH[facility]ICAL THERAP[facility]  for your care. Our goal is always to provide you with excellent care. Hearing back from our patients is one way we can continue to improve our services. Please take a few minutes to complete the written survey that you may receive in the mail after your visit with us. Thank you!             Your Updated Medication List - Protect others around you: Learn how to safely use, store and throw away your medicines at www.disposemymeds.org.          This list is accurate as of 7/6/18 11:59 PM.  Always use your most recent med list.                   Brand Name Dispense Instructions for use Diagnosis    ibuprofen 800 MG tablet    ADVIL/MOTRIN    100 tablet    Take 1 tablet (800 mg) by mouth every 8 hours as needed for moderate pain    Dog bite of right lower leg, initial encounter       LYRICA PO      Take 100 mg by mouth 3 times daily        oxyCODONE-acetaminophen 5-325 MG per tablet    PERCOCET     Take 1 tablet by mouth 2 times daily

## 2018-07-09 NOTE — PROGRESS NOTES
Subjective:  HPI                    Objective:  System    Physical Exam    General     ROS    Assessment/Plan:    PROGRESS  REPORT    Progress reporting period is from 6/6/18 to 7/6/18.     SUBJECTIVE  Subjective: Back to [place] on Tuesday. Walking up to 2 miles. Concerned about stairs w/o a railing at most homes. Needs to be able to ascend/descend stairs w/o railing           Changes in function: Yes, see goal flow sheet for change in function    ;   ,     The objective findings are from DOS 7/6/18.    OBJECTIVE  Objective: gait w/o deviation, minimal swelling present, knee AROM comparable B. dec balance on uneven surfaces, dec endurance with stair descent      ASSESSMENT/PLAN  Updated problem list and treatment plan: Diagnosis 1:  S/p L knee menisectomy  Pain -  self management, education and home program  Decreased strength - therapeutic exercise, therapeutic activities and home program  Decreased proprioception - neuro re-education, gait training, therapeutic activities and home program  STG/LTGs have been met or progress has been made towards goals:  Yes (See Goal flow sheet completed today.)  Assessment of Progress: The patient's condition is improving.  The patient's condition has potential to improve.  Patient is meeting short term goals and is progressing towards long term goals.  Self Management Plans:  Patient has been instructed in a home treatment program.  Patient is independent in a home treatment program.  Patient  has been instructed in self management of symptoms.  I have re-evaluated this patient and find that the nature, scope, duration and intensity of the therapy is appropriate for the medical condition of the patient.  Charlotte continues to require the following intervention to meet STG and LTG's: PT  The patient is returning to your office for a recheck appointment.    Recommendations:  This patient would benefit from continued therapy.     Frequency:  2 X a month, once daily  Duration:  for 2  months        Please refer to the daily flowsheet for treatment today, total treatment time and time spent performing 1:1 timed codes.

## 2018-07-16 ENCOUNTER — THERAPY VISIT (OUTPATIENT)
Dept: PHYSICAL THERAPY | Facility: CLINIC | Age: 48
End: 2018-07-16
Payer: OTHER MISCELLANEOUS

## 2018-07-16 DIAGNOSIS — R26.9 GAIT ABNORMALITY: ICD-10-CM

## 2018-07-16 DIAGNOSIS — M25.562 LEFT KNEE PAIN, UNSPECIFIED CHRONICITY: ICD-10-CM

## 2018-07-16 PROCEDURE — 97110 THERAPEUTIC EXERCISES: CPT | Mod: GP

## 2018-07-16 PROCEDURE — 97112 NEUROMUSCULAR REEDUCATION: CPT | Mod: GP

## 2018-07-16 NOTE — MR AVS SNAPSHOT
After Visit Summary   7/16/2018    Charlotte Driscoll    MRN: [id number]           Patient Information     Date Of Birth          1970        Visit Information        Provider Department      7/16/2018 3:40 PM Eri Baron ATC [facility] Physical Therapy        Today's Diagnoses     Left knee pain, unspecified chronicity        Gait abnormality           Follow-ups after your visit        Your next 10 appointments already scheduled     Jul 30, 2018  3:40 PM CDT   CHARANJIT Extremity with Eri Baron ATC   [facility] ([facility] [place]  )    [address] [place] [address]   [phone]            Aug 13, 2018  3:40 PM CDT   CHARANJIT Extremity with Eri Baron ATC   [facility]    [address] [place] [address]   [phone]            Aug 27, 2018  3:40 PM CDT   CHARANJIT Extremity with Eri Baron ATC   [facility] ([facility] [place]  )    [address] [place] [address]   [phone]            Sep 10, 2018  3:40 PM CDT   CHARANJIT Extremity with Eri Baron ATC   [facility]    [address] [place] [address]   [phone]              Who to contact     If you have questions or need follow up information about today's clinic visit or your schedule please contact [facility] ATHLETIC [facility] PHYSICAL THERAPY directly at [phone].  Normal or non-critical lab and imaging results will be communicated to you by MyChart, letter or phone within 4 business days after the clinic has received the results. If you do not hear from us within 7 days, please contact the clinic through MyChart or phone. If you have a critical or abnormal lab result, we will notify  you by phone as soon as possible.  Submit refill requests through [employer] or call your pharmacy and they will forward the refill request to us. Please allow 3 business days for your refill to be completed.          Additional Information About Your Visit        Care EveryWhere ID     This is your Care EveryWhere ID. This could be used by other organizations to access your [place] medical records  [id number]         Blood Pressure from Last 3 Encounters:   12/01/16 132/89   05/13/14 120/72    Weight from Last 3 Encounters:   12/01/16 83.9 kg (185 lb)              We Performed the Following     Neuromuscular Re-Education     Therapeutic Exercises        Primary Care Provider Office Phone # Fax #    [facility] [phone]       [address] [place] [address]        Equal Access to Services     GEOVANI COOPER : Rachelle rahman Socaryn, waaxda luqadaha, qaybta kaalmada adeegyada, rula pickett . So [facility] [phone].    ATENCIÓN: Si habla español, tiene a aguilar disposición servicios gratuitos de asistencia lingüística. [facility] [phone].    We comply with applicable federal civil rights laws and [place] laws. We do not discriminate on the basis of race, color, national origin, age, disability, sex, sexual orientation, or gender identity.            Thank you!     Thank you for choosing [facility] PH[facility]ICAL THERAP[facility]  for your care. Our goal is always to provide you with excellent care. Hearing back from our patients is one way we can continue to improve our services. Please take a few minutes to complete the written survey that you may receive in the mail after your visit with us. Thank you!             Your Updated Medication List - Protect others around you: Learn how to safely use, store and throw away your medicines at www.disposemymeds.org.          This list is accurate as of 7/16/18  4:35 PM.  Always use your  most recent med list.                   Brand Name Dispense Instructions for use Diagnosis    ibuprofen 800 MG tablet    ADVIL/MOTRIN    100 tablet    Take 1 tablet (800 mg) by mouth every 8 hours as needed for moderate pain    Dog bite of right lower leg, initial encounter       LYRICA PO      Take 100 mg by mouth 3 times daily        oxyCODONE-acetaminophen 5-325 MG per tablet    PERCOCET     Take 1 tablet by mouth 2 times daily

## 2019-06-27 PROBLEM — M25.562 LEFT KNEE PAIN: Status: RESOLVED | Noted: 2018-04-20 | Resolved: 2019-06-27

## 2019-06-27 PROBLEM — R26.9 GAIT ABNORMALITY: Status: RESOLVED | Noted: 2018-04-20 | Resolved: 2019-06-27

## 2019-06-27 NOTE — PROGRESS NOTES
Patient did not return for further treatment and no additional progress was noted.  Please refer to the progress note and goal flowsheet completed on 07/06/18 for discharge information.